# Patient Record
Sex: FEMALE | Race: WHITE | Employment: FULL TIME | URBAN - METROPOLITAN AREA
[De-identification: names, ages, dates, MRNs, and addresses within clinical notes are randomized per-mention and may not be internally consistent; named-entity substitution may affect disease eponyms.]

---

## 2019-01-14 ENCOUNTER — APPOINTMENT (OUTPATIENT)
Dept: ULTRASOUND IMAGING | Age: 39
End: 2019-01-14
Payer: COMMERCIAL

## 2019-01-14 ENCOUNTER — HOSPITAL ENCOUNTER (EMERGENCY)
Age: 39
Discharge: HOME | End: 2019-01-14
Attending: EMERGENCY MEDICINE
Payer: COMMERCIAL

## 2019-01-14 ENCOUNTER — APPOINTMENT (OUTPATIENT)
Dept: CT IMAGING | Age: 39
End: 2019-01-14
Payer: COMMERCIAL

## 2019-01-14 VITALS
TEMPERATURE: 97.9 F | HEART RATE: 68 BPM | SYSTOLIC BLOOD PRESSURE: 135 MMHG | HEIGHT: 65 IN | OXYGEN SATURATION: 97 % | DIASTOLIC BLOOD PRESSURE: 82 MMHG | WEIGHT: 228 LBS | RESPIRATION RATE: 16 BRPM | BODY MASS INDEX: 37.99 KG/M2

## 2019-01-14 DIAGNOSIS — R10.10 PAIN OF UPPER ABDOMEN: Primary | ICD-10-CM

## 2019-01-14 DIAGNOSIS — N83.209 HEMORRHAGIC OVARIAN CYST: ICD-10-CM

## 2019-01-14 LAB
-: ABNORMAL
ABSOLUTE EOS #: 0.4 K/UL (ref 0–0.4)
ABSOLUTE IMMATURE GRANULOCYTE: ABNORMAL K/UL (ref 0–0.3)
ABSOLUTE LYMPH #: 3.1 K/UL (ref 1–4.8)
ABSOLUTE MONO #: 0.8 K/UL (ref 0.1–1.2)
ALBUMIN SERPL-MCNC: 4.5 G/DL (ref 3.5–5.2)
ALBUMIN/GLOBULIN RATIO: 1.4 (ref 1–2.5)
ALP BLD-CCNC: 74 U/L (ref 35–104)
ALT SERPL-CCNC: 12 U/L (ref 5–33)
AMORPHOUS: ABNORMAL
AMYLASE: 44 U/L (ref 28–100)
ANION GAP SERPL CALCULATED.3IONS-SCNC: 11 MMOL/L (ref 9–17)
AST SERPL-CCNC: 13 U/L
BACTERIA: ABNORMAL
BASOPHILS # BLD: 1 % (ref 0–2)
BASOPHILS ABSOLUTE: 0.1 K/UL (ref 0–0.2)
BILIRUB SERPL-MCNC: 0.29 MG/DL (ref 0.3–1.2)
BILIRUBIN DIRECT: 0.09 MG/DL
BILIRUBIN URINE: NEGATIVE
BILIRUBIN, INDIRECT: 0.2 MG/DL (ref 0–1)
BUN BLDV-MCNC: 11 MG/DL (ref 6–20)
BUN/CREAT BLD: ABNORMAL (ref 9–20)
CALCIUM SERPL-MCNC: 8.8 MG/DL (ref 8.6–10.4)
CASTS UA: ABNORMAL /LPF
CHLORIDE BLD-SCNC: 102 MMOL/L (ref 98–107)
CO2: 24 MMOL/L (ref 20–31)
COLOR: YELLOW
COMMENT UA: ABNORMAL
CREAT SERPL-MCNC: 0.73 MG/DL (ref 0.5–0.9)
CRYSTALS, UA: ABNORMAL /HPF
DIFFERENTIAL TYPE: ABNORMAL
EKG ATRIAL RATE: 68 BPM
EKG P AXIS: 50 DEGREES
EKG P-R INTERVAL: 162 MS
EKG Q-T INTERVAL: 438 MS
EKG QRS DURATION: 80 MS
EKG QTC CALCULATION (BAZETT): 465 MS
EKG R AXIS: 16 DEGREES
EKG T AXIS: -32 DEGREES
EKG VENTRICULAR RATE: 68 BPM
EOSINOPHILS RELATIVE PERCENT: 3 % (ref 1–4)
EPITHELIAL CELLS UA: ABNORMAL /HPF (ref 0–5)
GFR AFRICAN AMERICAN: >60 ML/MIN
GFR NON-AFRICAN AMERICAN: >60 ML/MIN
GFR SERPL CREATININE-BSD FRML MDRD: ABNORMAL ML/MIN/{1.73_M2}
GFR SERPL CREATININE-BSD FRML MDRD: ABNORMAL ML/MIN/{1.73_M2}
GLOBULIN: ABNORMAL G/DL (ref 1.5–3.8)
GLUCOSE BLD-MCNC: 100 MG/DL (ref 70–99)
GLUCOSE URINE: NEGATIVE
HCG QUALITATIVE: NEGATIVE
HCT VFR BLD CALC: 38.8 % (ref 36–46)
HEMOGLOBIN: 13.1 G/DL (ref 12–16)
IMMATURE GRANULOCYTES: ABNORMAL %
KETONES, URINE: NEGATIVE
LEUKOCYTE ESTERASE, URINE: NEGATIVE
LIPASE: 25 U/L (ref 13–60)
LYMPHOCYTES # BLD: 26 % (ref 24–44)
MCH RBC QN AUTO: 28.7 PG (ref 26–34)
MCHC RBC AUTO-ENTMCNC: 33.9 G/DL (ref 31–37)
MCV RBC AUTO: 84.7 FL (ref 80–100)
MONOCYTES # BLD: 6 % (ref 2–11)
MUCUS: ABNORMAL
NITRITE, URINE: NEGATIVE
NRBC AUTOMATED: ABNORMAL PER 100 WBC
OTHER OBSERVATIONS UA: ABNORMAL
PDW BLD-RTO: 13 % (ref 12.5–15.4)
PH UA: 6 (ref 5–8)
PLATELET # BLD: 318 K/UL (ref 140–450)
PLATELET ESTIMATE: ABNORMAL
PMV BLD AUTO: 9.3 FL (ref 6–12)
POTASSIUM SERPL-SCNC: 3.7 MMOL/L (ref 3.7–5.3)
PROTEIN UA: NEGATIVE
RBC # BLD: 4.58 M/UL (ref 4–5.2)
RBC # BLD: ABNORMAL 10*6/UL
RBC UA: ABNORMAL /HPF (ref 0–2)
RENAL EPITHELIAL, UA: ABNORMAL /HPF
SEG NEUTROPHILS: 64 % (ref 36–66)
SEGMENTED NEUTROPHILS ABSOLUTE COUNT: 7.8 K/UL (ref 1.8–7.7)
SODIUM BLD-SCNC: 137 MMOL/L (ref 135–144)
SPECIFIC GRAVITY UA: 1 (ref 1–1.03)
TOTAL PROTEIN: 7.7 G/DL (ref 6.4–8.3)
TRICHOMONAS: ABNORMAL
TROPONIN INTERP: NORMAL
TROPONIN T: NORMAL NG/ML
TROPONIN, HIGH SENSITIVITY: <6 NG/L (ref 0–14)
TURBIDITY: CLEAR
URINE HGB: ABNORMAL
UROBILINOGEN, URINE: NORMAL
WBC # BLD: 12 K/UL (ref 3.5–11)
WBC # BLD: ABNORMAL 10*3/UL
WBC UA: ABNORMAL /HPF (ref 0–5)
YEAST: ABNORMAL

## 2019-01-14 PROCEDURE — 2580000003 HC RX 258: Performed by: EMERGENCY MEDICINE

## 2019-01-14 PROCEDURE — 84703 CHORIONIC GONADOTROPIN ASSAY: CPT

## 2019-01-14 PROCEDURE — 6360000004 HC RX CONTRAST MEDICATION: Performed by: EMERGENCY MEDICINE

## 2019-01-14 PROCEDURE — 80048 BASIC METABOLIC PNL TOTAL CA: CPT

## 2019-01-14 PROCEDURE — 99284 EMERGENCY DEPT VISIT MOD MDM: CPT

## 2019-01-14 PROCEDURE — 84484 ASSAY OF TROPONIN QUANT: CPT

## 2019-01-14 PROCEDURE — 83690 ASSAY OF LIPASE: CPT

## 2019-01-14 PROCEDURE — 81001 URINALYSIS AUTO W/SCOPE: CPT

## 2019-01-14 PROCEDURE — 76830 TRANSVAGINAL US NON-OB: CPT

## 2019-01-14 PROCEDURE — 36415 COLL VENOUS BLD VENIPUNCTURE: CPT

## 2019-01-14 PROCEDURE — 82150 ASSAY OF AMYLASE: CPT

## 2019-01-14 PROCEDURE — 93976 VASCULAR STUDY: CPT

## 2019-01-14 PROCEDURE — 96375 TX/PRO/DX INJ NEW DRUG ADDON: CPT

## 2019-01-14 PROCEDURE — 96374 THER/PROPH/DIAG INJ IV PUSH: CPT

## 2019-01-14 PROCEDURE — 80076 HEPATIC FUNCTION PANEL: CPT

## 2019-01-14 PROCEDURE — 93005 ELECTROCARDIOGRAM TRACING: CPT

## 2019-01-14 PROCEDURE — 74177 CT ABD & PELVIS W/CONTRAST: CPT

## 2019-01-14 PROCEDURE — 85025 COMPLETE CBC W/AUTO DIFF WBC: CPT

## 2019-01-14 PROCEDURE — 6360000002 HC RX W HCPCS: Performed by: EMERGENCY MEDICINE

## 2019-01-14 PROCEDURE — 76856 US EXAM PELVIC COMPLETE: CPT

## 2019-01-14 RX ORDER — ACETAMINOPHEN 160 MG
TABLET,DISINTEGRATING ORAL DAILY
COMMUNITY

## 2019-01-14 RX ORDER — CYCLOBENZAPRINE HCL 10 MG
10 TABLET ORAL 3 TIMES DAILY PRN
COMMUNITY
End: 2019-05-02 | Stop reason: SDUPTHER

## 2019-01-14 RX ORDER — MONTELUKAST SODIUM 10 MG/1
10 TABLET ORAL NIGHTLY
COMMUNITY
End: 2019-03-28 | Stop reason: SDUPTHER

## 2019-01-14 RX ORDER — 0.9 % SODIUM CHLORIDE 0.9 %
1000 INTRAVENOUS SOLUTION INTRAVENOUS ONCE
Status: COMPLETED | OUTPATIENT
Start: 2019-01-14 | End: 2019-01-14

## 2019-01-14 RX ORDER — ONDANSETRON 2 MG/ML
4 INJECTION INTRAMUSCULAR; INTRAVENOUS ONCE
Status: COMPLETED | OUTPATIENT
Start: 2019-01-14 | End: 2019-01-14

## 2019-01-14 RX ORDER — HYDROCODONE BITARTRATE AND ACETAMINOPHEN 5; 325 MG/1; MG/1
1-2 TABLET ORAL EVERY 6 HOURS PRN
Qty: 20 TABLET | Refills: 0 | Status: SHIPPED | OUTPATIENT
Start: 2019-01-14 | End: 2019-01-19

## 2019-01-14 RX ORDER — 0.9 % SODIUM CHLORIDE 0.9 %
60 INTRAVENOUS SOLUTION INTRAVENOUS ONCE
Status: COMPLETED | OUTPATIENT
Start: 2019-01-14 | End: 2019-01-14

## 2019-01-14 RX ORDER — PANTOPRAZOLE SODIUM 40 MG/1
40 TABLET, DELAYED RELEASE ORAL DAILY
Qty: 30 TABLET | Refills: 0 | Status: SHIPPED | OUTPATIENT
Start: 2019-01-14 | End: 2019-05-02 | Stop reason: CLARIF

## 2019-01-14 RX ORDER — SODIUM CHLORIDE 0.9 % (FLUSH) 0.9 %
10 SYRINGE (ML) INJECTION PRN
Status: DISCONTINUED | OUTPATIENT
Start: 2019-01-14 | End: 2019-01-14 | Stop reason: HOSPADM

## 2019-01-14 RX ORDER — ESCITALOPRAM OXALATE 20 MG/1
20 TABLET ORAL DAILY
COMMUNITY
End: 2019-01-22 | Stop reason: SDUPTHER

## 2019-01-14 RX ADMIN — IOPAMIDOL 75 ML: 755 INJECTION, SOLUTION INTRAVENOUS at 11:11

## 2019-01-14 RX ADMIN — Medication 10 ML: at 11:11

## 2019-01-14 RX ADMIN — SODIUM CHLORIDE 1000 ML: 9 INJECTION, SOLUTION INTRAVENOUS at 09:52

## 2019-01-14 RX ADMIN — ONDANSETRON 4 MG: 2 INJECTION INTRAMUSCULAR; INTRAVENOUS at 09:52

## 2019-01-14 RX ADMIN — SODIUM CHLORIDE 60 ML: 9 INJECTION, SOLUTION INTRAVENOUS at 11:11

## 2019-01-14 RX ADMIN — HYDROMORPHONE HYDROCHLORIDE 1 MG: 1 INJECTION, SOLUTION INTRAMUSCULAR; INTRAVENOUS; SUBCUTANEOUS at 09:52

## 2019-01-14 RX ADMIN — IOHEXOL 50 ML: 240 INJECTION, SOLUTION INTRATHECAL; INTRAVASCULAR; INTRAVENOUS; ORAL at 11:11

## 2019-01-14 ASSESSMENT — PAIN DESCRIPTION - ORIENTATION: ORIENTATION: MID;UPPER

## 2019-01-14 ASSESSMENT — PAIN DESCRIPTION - PROGRESSION: CLINICAL_PROGRESSION: GRADUALLY IMPROVING

## 2019-01-14 ASSESSMENT — PAIN DESCRIPTION - LOCATION
LOCATION: ABDOMEN
LOCATION: ABDOMEN

## 2019-01-14 ASSESSMENT — PAIN SCALES - GENERAL
PAINLEVEL_OUTOF10: 3
PAINLEVEL_OUTOF10: 7

## 2019-01-22 ENCOUNTER — HOSPITAL ENCOUNTER (OUTPATIENT)
Age: 39
Setting detail: SPECIMEN
Discharge: HOME | End: 2019-01-22
Payer: COMMERCIAL

## 2019-01-22 DIAGNOSIS — R73.9 HYPERGLYCEMIA: ICD-10-CM

## 2019-01-22 DIAGNOSIS — Z00.00 WELL ADULT HEALTH CHECK: ICD-10-CM

## 2019-01-22 DIAGNOSIS — E78.2 MIXED HYPERLIPIDEMIA: ICD-10-CM

## 2019-01-22 PROBLEM — M79.7 FIBROMYALGIA: Status: ACTIVE | Noted: 2019-01-22

## 2019-01-22 LAB
CHOLESTEROL/HDL RATIO: 5.9
CHOLESTEROL: 234 MG/DL
ESTIMATED AVERAGE GLUCOSE: 120 MG/DL
HBA1C MFR BLD: 5.8 % (ref 4–6)
HDLC SERPL-MCNC: 40 MG/DL
LDL CHOLESTEROL: 169 MG/DL (ref 0–130)
TRIGL SERPL-MCNC: 124 MG/DL
VLDLC SERPL CALC-MCNC: ABNORMAL MG/DL (ref 1–30)

## 2019-06-26 ENCOUNTER — HOSPITAL ENCOUNTER (OUTPATIENT)
Dept: NEUROLOGY | Age: 39
Discharge: HOME | End: 2019-06-26
Payer: COMMERCIAL

## 2019-06-26 DIAGNOSIS — G56.03 BILATERAL CARPAL TUNNEL SYNDROME: ICD-10-CM

## 2019-06-26 PROCEDURE — 95910 NRV CNDJ TEST 7-8 STUDIES: CPT

## 2019-06-26 PROCEDURE — 95886 MUSC TEST DONE W/N TEST COMP: CPT

## 2019-07-12 DIAGNOSIS — G56.03 BILATERAL CARPAL TUNNEL SYNDROME: ICD-10-CM

## 2019-07-12 DIAGNOSIS — M25.531 PAIN IN BOTH WRISTS: Primary | ICD-10-CM

## 2019-07-12 DIAGNOSIS — M25.532 PAIN IN BOTH WRISTS: Primary | ICD-10-CM

## 2019-07-16 ENCOUNTER — OFFICE VISIT (OUTPATIENT)
Dept: ORTHOPEDIC SURGERY | Age: 39
End: 2019-07-16
Payer: COMMERCIAL

## 2019-07-16 VITALS — HEIGHT: 65 IN | BODY MASS INDEX: 37.49 KG/M2 | WEIGHT: 225 LBS

## 2019-07-16 DIAGNOSIS — G56.01 RIGHT CARPAL TUNNEL SYNDROME: ICD-10-CM

## 2019-07-16 DIAGNOSIS — M77.12 LATERAL EPICONDYLITIS OF LEFT ELBOW: ICD-10-CM

## 2019-07-16 DIAGNOSIS — G56.02 LEFT CARPAL TUNNEL SYNDROME: Primary | ICD-10-CM

## 2019-07-16 PROCEDURE — 99203 OFFICE O/P NEW LOW 30 MIN: CPT | Performed by: ORTHOPAEDIC SURGERY

## 2019-07-20 PROBLEM — M77.12 LATERAL EPICONDYLITIS OF LEFT ELBOW: Status: ACTIVE | Noted: 2019-07-20

## 2019-07-20 PROBLEM — G56.01 RIGHT CARPAL TUNNEL SYNDROME: Status: ACTIVE | Noted: 2019-07-20

## 2019-07-20 PROBLEM — G56.02 LEFT CARPAL TUNNEL SYNDROME: Status: ACTIVE | Noted: 2019-07-20

## 2019-07-20 NOTE — PROGRESS NOTES
ORTHOPEDIC PATIENT EVALUATION      HPI / Chief Complaint  Heidi Cloud is a 43 y.o. right hand dominant female who presents for evaluation of both her hands. She's been dealing with bilateral hand numbness and tingling involving the thumb, index and middle fingers. It's usually intermittent and has been ongoing for the past 2 years. It wakes her up at night and she has a difficult time gripping and feeling with her hands at work. 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 She has attempted treatment with nighttime splinting for the past 1-1/2 years. She also has pain in the posterolateral left elbow which is constant but worse with activity. Past Medical History  Ora Smoke  has a past medical history of Anxiety, Asthma, Depression, Fibromyalgia, H/O bronchitis, Hyperlipidemia, Sinusitis, and Type II or unspecified type diabetes mellitus without mention of complication, not stated as uncontrolled. Past Surgical History  Ora Smoke  has a past surgical history that includes Cholecystectomy and Tonsillectomy.     Current Medications  Current Outpatient Medications   Medication Sig Dispense Refill    gabapentin (NEURONTIN) 100 MG capsule TAKE 1 CAPSULE BY MOUTH THREE TIMES A DAY  90 capsule 0    cyclobenzaprine (FLEXERIL) 10 MG tablet TAKE 1 TABLET BY MOUTH THREE TIMES A DAY AS NEEDED FOR MUSCLE SPASM 90 tablet 0    escitalopram (LEXAPRO) 20 MG tablet Take 1 tablet by mouth daily 30 tablet 3    omeprazole (PRILOSEC) 40 MG delayed release capsule TAKE 1 CAPSULE BY MOUTH ONE TIME A DAY 30 capsule 3    meloxicam (MOBIC) 15 MG tablet Take 1 tablet by mouth daily as needed for Pain 30 tablet 3    montelukast (SINGULAIR) 10 MG tablet Take 1 tablet by mouth nightly 30 tablet 5    dicyclomine (BENTYL) 10 MG capsule Take 1 capsule by mouth 4 times daily as needed (diarrhea) 360 capsule 1    Cholecalciferol (VITAMIN D3) 2000 units CAPS Take by mouth daily       Turmeric 450 MG CAPS Take by

## 2020-03-25 PROBLEM — E78.5 HYPERLIPIDEMIA: Status: RESOLVED | Noted: 2020-03-25 | Resolved: 2020-03-24

## 2020-04-01 ENCOUNTER — NURSE TRIAGE (OUTPATIENT)
Dept: OTHER | Facility: CLINIC | Age: 40
End: 2020-04-01

## 2020-04-01 NOTE — TELEPHONE ENCOUNTER
have a weak immune system? \" (e.g., CHF, COPD, asthma, HIV positive, chemotherapy, renal failure, diabetes mellitus, sickle cell anemia)        asthma    Protocols used: CORONAVIRUS (COVID-19) EXPOSURE-ADULT-AH    Patient's wife calling on her behalf. The patient has asthma and woke up this morning feeling nauseous, runny nose, cough and the chills. She does work outside of the home but no known exposure to anyone that has tested positive for COVID-19. Disposition to continue to treat at home, however, wife and patient seem concerned with the fact that she has asthma. Gave her information for the flu clinic as well as a virtual visit if the patient worsens or they want her assessed. Wife agreeable with plan.

## 2020-11-19 ENCOUNTER — HOSPITAL ENCOUNTER (OUTPATIENT)
Facility: CLINIC | Age: 40
Discharge: HOME | End: 2020-11-21
Payer: COMMERCIAL

## 2020-11-19 ENCOUNTER — HOSPITAL ENCOUNTER (OUTPATIENT)
Dept: GENERAL RADIOLOGY | Facility: CLINIC | Age: 40
Discharge: HOME | End: 2020-11-21
Payer: COMMERCIAL

## 2020-11-19 PROCEDURE — 73502 X-RAY EXAM HIP UNI 2-3 VIEWS: CPT

## 2021-10-28 ENCOUNTER — HOSPITAL ENCOUNTER (EMERGENCY)
Age: 41
Discharge: HOME | End: 2021-10-28
Attending: EMERGENCY MEDICINE
Payer: COMMERCIAL

## 2021-10-28 ENCOUNTER — APPOINTMENT (OUTPATIENT)
Dept: CT IMAGING | Age: 41
End: 2021-10-28
Payer: COMMERCIAL

## 2021-10-28 ENCOUNTER — APPOINTMENT (OUTPATIENT)
Dept: GENERAL RADIOLOGY | Age: 41
End: 2021-10-28
Payer: COMMERCIAL

## 2021-10-28 VITALS
SYSTOLIC BLOOD PRESSURE: 136 MMHG | WEIGHT: 218 LBS | BODY MASS INDEX: 37.22 KG/M2 | RESPIRATION RATE: 16 BRPM | HEIGHT: 64 IN | DIASTOLIC BLOOD PRESSURE: 91 MMHG | HEART RATE: 77 BPM | OXYGEN SATURATION: 97 % | TEMPERATURE: 97.5 F

## 2021-10-28 DIAGNOSIS — S80.02XA CONTUSION OF LEFT KNEE, INITIAL ENCOUNTER: Primary | ICD-10-CM

## 2021-10-28 DIAGNOSIS — S70.02XA CONTUSION OF LEFT HIP, INITIAL ENCOUNTER: ICD-10-CM

## 2021-10-28 DIAGNOSIS — S09.90XA CLOSED HEAD INJURY, INITIAL ENCOUNTER: ICD-10-CM

## 2021-10-28 LAB — HCG(URINE) PREGNANCY TEST: NEGATIVE

## 2021-10-28 PROCEDURE — 73562 X-RAY EXAM OF KNEE 3: CPT

## 2021-10-28 PROCEDURE — 81025 URINE PREGNANCY TEST: CPT

## 2021-10-28 PROCEDURE — 73502 X-RAY EXAM HIP UNI 2-3 VIEWS: CPT

## 2021-10-28 PROCEDURE — 99283 EMERGENCY DEPT VISIT LOW MDM: CPT

## 2021-10-28 PROCEDURE — 70450 CT HEAD/BRAIN W/O DYE: CPT

## 2021-10-28 RX ORDER — ACETAMINOPHEN 500 MG
1000 TABLET ORAL ONCE
Status: DISCONTINUED | OUTPATIENT
Start: 2021-10-28 | End: 2021-10-28 | Stop reason: HOSPADM

## 2021-10-28 RX ORDER — DEXTROAMPHETAMINE SACCHARATE, AMPHETAMINE ASPARTATE MONOHYDRATE, DEXTROAMPHETAMINE SULFATE AND AMPHETAMINE SULFATE 2.5; 2.5; 2.5; 2.5 MG/1; MG/1; MG/1; MG/1
15 CAPSULE, EXTENDED RELEASE ORAL EVERY MORNING
COMMUNITY

## 2021-10-28 ASSESSMENT — PAIN DESCRIPTION - ORIENTATION: ORIENTATION: LEFT

## 2021-10-28 ASSESSMENT — PAIN DESCRIPTION - LOCATION: LOCATION: KNEE;HIP

## 2021-10-28 ASSESSMENT — PAIN DESCRIPTION - DESCRIPTORS: DESCRIPTORS: ACHING

## 2021-10-28 ASSESSMENT — PAIN DESCRIPTION - PAIN TYPE: TYPE: ACUTE PAIN

## 2021-10-28 ASSESSMENT — ENCOUNTER SYMPTOMS
SHORTNESS OF BREATH: 0
RHINORRHEA: 1
ABDOMINAL PAIN: 0
COUGH: 0
BACK PAIN: 0
VOMITING: 0
DIARRHEA: 0
NAUSEA: 0
SORE THROAT: 0
CONSTIPATION: 0
PHOTOPHOBIA: 0

## 2021-10-28 ASSESSMENT — PAIN SCALES - GENERAL: PAINLEVEL_OUTOF10: 6

## 2021-10-28 ASSESSMENT — PAIN DESCRIPTION - FREQUENCY: FREQUENCY: CONTINUOUS

## 2021-10-28 NOTE — LETTER
[id number] [facility] ED  [address] [facility] [address]  Phone: [phone]  Fax: [phone]             October 28, 2021    Patient: Nelly Renteria   YOB: 1980   Date of Visit: 10/28/2021       To Whom It May Concern:    Andrea Sandoval was seen and treated in our emergency department on 10/28/2021. She may return to work on 10/29/2021.       Sincerely,             Signature:__________________________________

## 2021-10-28 NOTE — ED PROVIDER NOTES
[id number] [facility] ED  [address] [facility] [address]  Phone: [phone]  Fax: [phone]        Pt Name: Kerry Suero  MRN: [id number]  [place] 1980  Date of evaluation: 10/28/21      CHIEF COMPLAINT     Chief Complaint   Patient presents with   [address] PTA, UNSURE WHETHER SHE HIT HER HEAD, C/O LEFT HIP AND KNEE PAIN, NO OBVIOUS DEFORMITY NOTED, PAIN WITH AMBULATION. HISTORY OF PRESENT ILLNESS  (Location/Symptom, Timing/Onset, Context/Setting, Quality, Duration, Modifying Factors, Severity.)    Kerry Suero is a 42 y.o. female who presents fall from standing. The patient was at work, and tripped over a pallet landing on her left knee and left hip. She is unsure if she struck her head. No loss of consciousness. Patient states that she is not on any anticoagulation. She denies neck, back, or abdominal pain, but states that she is just generally sore. No nausea or vomiting. She does recall the entire event. She has been ambulatory since the fall. Her wife is at bedside, and states that the patient is very slow to respond to questions, and she was concerned that the patient was having some nystagmus. The patient's wife feels that Oralia Weber is not acting herself. REVIEW OF SYSTEMS    (2-9 systems for level 4, 10 or more for level 5)     Review of Systems   Constitutional: Negative for chills and fever. HENT: Positive for congestion and rhinorrhea. Negative for sore throat. Eyes: Negative for photophobia and visual disturbance. Respiratory: Negative for cough and shortness of breath. Cardiovascular: Negative for chest pain, palpitations and leg swelling. Gastrointestinal: Negative for abdominal pain, constipation, diarrhea, nausea and vomiting. Genitourinary: Negative for dysuria, frequency and urgency. Musculoskeletal: Negative for back pain and neck pain. Skin: Negative for rash and wound.    Neurological: Negative for dizziness, light-headedness and headaches. Hematological: Negative for adenopathy. Does not bruise/bleed easily. PAST MEDICAL HISTORY    has a past medical history of Anxiety, Asthma, Depression, Fibromyalgia, H/O bronchitis, Hyperlipidemia, Left carpal tunnel syndrome, Sinusitis, and Type II or unspecified type diabetes mellitus without mention of complication, not stated as uncontrolled. SURGICAL HISTORY      has a past surgical history that includes Cholecystectomy and Tonsillectomy. CURRENTMEDICATIONS       Previous Medications    ALBUTEROL (PROVENTIL HFA;VENTOLIN HFA) 108 (90 BASE) MCG/ACT INHALER    Inhale 2 puffs into the lungs every 6 hours as needed. ALPRAZOLAM (XANAX) 0.25 MG TABLET    Take 0.25 mg by mouth nightly as needed for Sleep. AMPHETAMINE-DEXTROAMPHETAMINE (ADDERALL XR) 10 MG EXTENDED RELEASE CAPSULE    Take 15 mg by mouth every morning.     BUSPIRONE (BUSPAR) 10 MG TABLET    TAKE 1 TABLET BY MOUTH THREE TIMES A DAY    CHOLECALCIFEROL (VITAMIN D3) 2000 UNITS CAPS    Take by mouth daily     CYCLOBENZAPRINE (FLEXERIL) 10 MG TABLET    TAKE 1 TABLET BY MOUTH THREE TIMES A DAY AS NEEDED FOR MUSCLE SPASM    DICYCLOMINE (BENTYL) 10 MG CAPSULE    Take 1 capsule by mouth 4 times daily as needed (diarrhea)    GABAPENTIN (NEURONTIN) 100 MG CAPSULE    TAKE 1 CAPSULE BY MOUTH THREE TIMES A DAY     GABAPENTIN (NEURONTIN) 100 MG CAPSULE    TAKE 1 CAPSULE BY MOUTH THREE TIMES A DAY     HYDROXYZINE (ATARAX) 10 MG TABLET    Take 10 mg by mouth 3 times daily as needed for Itching    HYDROXYZINE (ATARAX) 25 MG TABLET    Take 25 mg by mouth 3 times daily as needed for Itching    MELOXICAM (MOBIC) 15 MG TABLET    TAKE 1 TABLET BY MOUTH ONE TIME A DAY AS NEEDED FOR PAIN    OMEGA-3 FATTY ACIDS (FISH OIL) 600 MG CAPS    Take 1,200 mg by mouth daily     OMEPRAZOLE (PRILOSEC) 40 MG DELAYED RELEASE CAPSULE    TAKE 1 CAPSULE BY MOUTH ONE TIME A DAY     PREDNISONE (DELTASONE) 10 MG TABLET    Take 3 pills orally daily for 3 days then take 2 pills orally daily for 3 days then take 1 pill orally daily for 3 days then stop. PREGABALIN (LYRICA) 50 MG CAPSULE    Take 1 capsule by mouth 3 times daily for 30 days. TIZANIDINE (ZANAFLEX) 4 MG TABLET    TAKE 1 TABLET BY MOUTH THREE TIMES A DAY AS NEEDED for muscle spasms     TURMERIC 450 MG CAPS    Take by mouth daily    VENLAFAXINE (EFFEXOR XR) 37.5 MG EXTENDED RELEASE CAPSULE    Take 37.5 mg by mouth daily    VENLAFAXINE (EFFEXOR XR) 75 MG EXTENDED RELEASE CAPSULE    Take 75 mg by mouth daily       ALLERGIES     is allergic to cymbalta [duloxetine hcl]; dilaudid [hydromorphone]; and skin protectants, misc. Jeannine Boone FAMILY HISTORY     She indicated that her mother is alive. She indicated that her father is alive. family history includes Diabetes in her father; Emphysema in her father; Heart Disease in her father and mother; Hypertension in her father and mother. SOCIAL HISTORY      reports that she has never smoked. She has never used smokeless tobacco. She reports that she does not drink alcohol and does not use drugs. PHYSICAL EXAM    (up to 7 for level 4, 8 or more for level 5)   INITIAL VITALS:  height is 5' 4\" (1.626 m) and weight is 98.9 kg (218 lb). Her oral temperature is 97.5 °F (36.4 °C). Her blood pressure is 136/91 (abnormal) and her pulse is 77. Her respiration is 16 and oxygen saturation is 97%. Physical Exam  Vitals and nursing note reviewed. Constitutional:       Appearance: Normal appearance. She is not ill-appearing. HENT:      Head: Normocephalic. Comments: No rizo's sign. No raccoon eyes. No hemotympanum       Nose: Nose normal.      Mouth/Throat:      Mouth: Mucous membranes are moist.      Pharynx: Oropharynx is clear. Eyes:      Extraocular Movements: Extraocular movements intact. Pupils: Pupils are equal, round, and reactive to light. Cardiovascular:      Rate and Rhythm: Normal rate and regular rhythm.       Pulses: Normal pulses. Heart sounds: Normal heart sounds. No murmur heard. No friction rub. No gallop. Pulmonary:      Effort: Pulmonary effort is normal.      Breath sounds: Normal breath sounds. No wheezing, rhonchi or rales. Abdominal:      General: Abdomen is flat. Bowel sounds are normal.      Palpations: Abdomen is soft. Tenderness: There is no abdominal tenderness. There is no guarding or rebound. Musculoskeletal:         General: Tenderness present. Normal range of motion. Cervical back: Normal range of motion and neck supple. No tenderness. Right hip: Normal.      Left hip: Tenderness and bony tenderness present. No deformity, lacerations or crepitus. Normal range of motion. Normal strength. Right knee: Normal.      Left knee: Ecchymosis and bony tenderness present. No swelling, deformity, effusion, erythema, lacerations or crepitus. Normal range of motion. Tenderness present over the lateral joint line. No LCL laxity, MCL laxity, ACL laxity or PCL laxity. Normal pulse. Right lower leg: No edema. Left lower leg: No edema. Comments: Tender on left lateral hip. No ecchymosis. Full ROM. Small bruise on the right upper quadrant of the patella without crepitus or deformity. Skin:     General: Skin is warm and dry. Capillary Refill: Capillary refill takes less than 2 seconds. Findings: Bruising present. Neurological:      General: No focal deficit present. Mental Status: She is alert and oriented to person, place, and time. Cranial Nerves: No cranial nerve deficit. Sensory: No sensory deficit. Motor: No weakness. Coordination: Coordination normal.      Comments: +nystagmus in extremes of gaze that extinguishes    GCS 15   Psychiatric:         Mood and Affect: Mood normal.         Behavior: Behavior normal.         Thought Content:  Thought content normal.         DIFFERENTIAL DIAGNOSIS/ MDM:     [place] CT Head Rule  GCS score of <15 at 2 hrs after injury   No  Suspected open or depressed skull fracture? No  Sign(s) of basilar skull fracture    No  Vomiting greater than or equal to 2 episodes   No  Age greater or equal to 65 years? No  Pre-impact amnesia greater or equal to 30 minutes? No  Dangerous mechanism? No    The patient presents with a closed head injury. The patient is neurologically intact. The presentation does not suggest a serious head injury. Signs and symptoms of a serious head injury have been discussed with the patient and caregiver, who will be vigilant for these. Concerns of repeat head injury have also been discussed. The patient has been observed adequately in the ED. Pt has been instructed to return to the ED if symptoms do not go away or worsen or change in any way. The patient understands that at this time there is no evidence for a more malignant underlying process, but the patient also understands that early in the process of an illness or injury, an emergency department workup can be falsely reassuring. Routine discharge counseling was given, and the patient understands that worsening, changing or persistent symptoms should prompt an immediate call or follow up with their primary physician or return to the emergency department. The importance of appropriate follow up was also discussed. I have reviewed the disposition diagnosis with the patient and or their family/guardian. I have answered their questions and given discharge instructions. They voiced understanding of these instructions and did not have any further questions or complaints.       DIAGNOSTIC RESULTS     EKG: All EKG's are interpreted by the Emergency Department Physician who either signs or Co-signs this chart in the absence of a cardiologist.    none    RADIOLOGY:        Interpretation per the Radiologist below, if available at the time of this note:    XR HIP LEFT (2-3 VIEWS)    Result Date: 10/28/2021  EXAMINATION: TWO XRAY VIEWS OF THE LEFT HIP 10/28/2021 1:11 pm COMPARISON: Left hip, two view 11/19/2020 HISTORY: ORDERING SYSTEM PROVIDED HISTORY: hip pain TECHNOLOGIST PROVIDED HISTORY: hip pain Reason for Exam: PT FELL AT WORK TODAY AND COMPLAINS OF LEFT HIP AND KNEE PAIN. HURTS TO WALK Acuity: Acute Type of Exam: Initial FINDINGS: AP and lateral views of the left hip were obtained. Bone mineralization appears to be within normal limits. There is no acute fracture or dislocation. The hip joint space appears preserved without significant osteophytosis. The visualized soft tissue structures are unremarkable. No acute bony abnormality or significant degenerative change about the left hip. XR KNEE LEFT (3 VIEWS)    Result Date: 10/28/2021  EXAMINATION: THREE XRAY VIEWS OF THE LEFT KNEE 10/28/2021 1:11 pm COMPARISON: None. HISTORY: ORDERING SYSTEM PROVIDED HISTORY: knee pain TECHNOLOGIST PROVIDED HISTORY: knee pain Reason for Exam: PT FELL AT WORK TODAY AND COMPLAINS OF LEFT HIP AND KNEE PAIN. HURTS TO WALK Acuity: Acute Type of Exam: Initial FINDINGS: No acute fracture or dislocation. Joint is in anatomic alignment. Bone mineralization is normal.  No effusion. Soft tissues unremarkable. Normal knee radiographs. CT HEAD WO CONTRAST    Result Date: 10/28/2021  EXAMINATION: CT OF THE HEAD WITHOUT CONTRAST  10/28/2021 1:12 pm TECHNIQUE: CT of the head was performed without the administration of intravenous contrast. Dose modulation, iterative reconstruction, and/or weight based adjustment of the mA/kV was utilized to reduce the radiation dose to as low as reasonably achievable. COMPARISON: Head CT 12/12/2014 HISTORY: ORDERING SYSTEM PROVIDED HISTORY: fall, head injury TECHNOLOGIST PROVIDED HISTORY: fall, head injury Is the patient pregnant?->No Reason for Exam: Pt fell at work today and is unsure if she hit her head.  Left hip and knee pain Acuity: Acute Type of Exam: Initial Mechanism of Injury: fall FINDINGS: BRAIN/VENTRICLES: There is no acute intracranial hemorrhage, mass effect or midline shift. No abnormal extra-axial fluid collection. The gray-white differentiation is maintained without evidence of an acute infarct. There is no evidence of hydrocephalus. Mild bifrontal and biparietal periventricular and subcortical white matter hypoattenuation is noted. ORBITS: The visualized portion of the orbits demonstrate no acute abnormality. SINUSES: 1 cm mucoid retention cyst anterior left maxillary sinus. The visualized paranasal sinuses and mastoid air cells demonstrate no acute abnormality. SOFT TISSUES/SKULL:  No acute abnormality of the visualized skull or soft tissues. No acute intracranial abnormality. White matter hypoattenuation described is typical of microvascular ischemic disease or as sequela of dysmyelinating/demyelinating processes. LABS:  Results for orders placed or performed during the hospital encounter of 10/28/21   Pregnancy, Urine   Result Value Ref Range    HCG(Urine) Pregnancy Test NEGATIVE NEGATIVE       Pregnancy negative    EMERGENCY DEPARTMENT COURSE:   Vitals:    Vitals:    10/28/21 1224   BP: (!) 136/91   Pulse: 77   Resp: 16   Temp: 97.5 °F (36.4 °C)   TempSrc: Oral   SpO2: 97%   Weight: 98.9 kg (218 lb)   Height: 5' 4\" (1.626 m)     -------------------------  BP: (!) 136/91, Temp: 97.5 °F (36.4 °C), Pulse: 77, Resp: 16      RE-EVALUATION:  1:41 PM EDT  Patient updated on test results, including CT head incidental findings. I recommended close follow up with her PCP for this issue with potentially a MRI. Patient doesn't report any symptoms of MS/stroke, etc at this point. CONSULTS:  None     PROCEDURES:  None    FINAL IMPRESSION      1. Contusion of left knee, initial encounter    2. Closed head injury, initial encounter    3.  Contusion of left hip, initial encounter          DISPOSITION/PLAN   DISPOSITION Decision To Discharge 10/28/2021 01:42:29 PM      CONDITION ON DISPOSITION:   Stable     PATIENT REFERRED TO:  Shanice Yusuf, [address] [facility]  [phone]    Schedule an appointment as soon as possible for a visit in 2 days      [address]  [phone]  Schedule an appointment as soon as possible for a visit in 1 day        DISCHARGE MEDICATIONS:  New Prescriptions    No medications on file       (Please note that portions of this note were completed with a voicerecognition program.  Efforts were made to edit the dictations but occasionally words are mis-transcribed.)    Randee Palacios MD  Attending Emergency Medicine Physician        Randee Palacios MD  10/28/21 1108